# Patient Record
Sex: MALE | Race: WHITE | NOT HISPANIC OR LATINO | URBAN - METROPOLITAN AREA
[De-identification: names, ages, dates, MRNs, and addresses within clinical notes are randomized per-mention and may not be internally consistent; named-entity substitution may affect disease eponyms.]

---

## 2018-10-16 PROBLEM — Z00.00 ENCOUNTER FOR PREVENTIVE HEALTH EXAMINATION: Status: ACTIVE | Noted: 2018-10-16

## 2018-10-22 ENCOUNTER — OUTPATIENT (OUTPATIENT)
Dept: OUTPATIENT SERVICES | Facility: HOSPITAL | Age: 78
LOS: 1 days | End: 2018-10-22
Payer: MEDICARE

## 2018-10-22 ENCOUNTER — APPOINTMENT (OUTPATIENT)
Dept: CT IMAGING | Facility: HOSPITAL | Age: 78
End: 2018-10-22
Payer: MEDICARE

## 2018-10-22 PROCEDURE — 74176 CT ABD & PELVIS W/O CONTRAST: CPT

## 2018-10-22 PROCEDURE — 74176 CT ABD & PELVIS W/O CONTRAST: CPT | Mod: 26

## 2018-10-24 VITALS
HEIGHT: 68 IN | WEIGHT: 139.11 LBS | HEART RATE: 87 BPM | DIASTOLIC BLOOD PRESSURE: 70 MMHG | OXYGEN SATURATION: 98 % | TEMPERATURE: 99 F | RESPIRATION RATE: 16 BRPM | SYSTOLIC BLOOD PRESSURE: 181 MMHG

## 2018-10-25 ENCOUNTER — OUTPATIENT (OUTPATIENT)
Dept: OUTPATIENT SERVICES | Facility: HOSPITAL | Age: 78
LOS: 1 days | Discharge: HOME | End: 2018-10-25
Payer: MEDICARE

## 2018-10-25 ENCOUNTER — RESULT REVIEW (OUTPATIENT)
Age: 78
End: 2018-10-25

## 2018-10-25 VITALS — HEART RATE: 60 BPM | TEMPERATURE: 97 F | RESPIRATION RATE: 18 BRPM | OXYGEN SATURATION: 99 %

## 2018-10-25 PROCEDURE — C1781: CPT

## 2018-10-25 PROCEDURE — 88302 TISSUE EXAM BY PATHOLOGIST: CPT

## 2018-10-25 PROCEDURE — 49505 PRP I/HERN INIT REDUC >5 YR: CPT | Mod: LT

## 2018-10-25 RX ORDER — ACETAMINOPHEN 500 MG
650 TABLET ORAL EVERY 6 HOURS
Qty: 0 | Refills: 0 | Status: DISCONTINUED | OUTPATIENT
Start: 2018-10-25 | End: 2018-10-25

## 2018-10-25 RX ORDER — DOCUSATE SODIUM 100 MG
1 CAPSULE ORAL
Qty: 10 | Refills: 0
Start: 2018-10-25 | End: 2018-10-29

## 2018-10-25 RX ORDER — BUPIVACAINE 13.3 MG/ML
20 INJECTION, SUSPENSION, LIPOSOMAL INFILTRATION ONCE
Qty: 0 | Refills: 0 | Status: DISCONTINUED | OUTPATIENT
Start: 2018-10-25 | End: 2018-10-25

## 2018-10-25 NOTE — BRIEF OPERATIVE NOTE - PROCEDURE
<<-----Click on this checkbox to enter Procedure Left inguinal hernia repair  10/25/2018    Active  DOTTIE

## 2018-10-25 NOTE — PACU DISCHARGE NOTE - COMMENTS
PT AWAKE ALERT AND ORIENTED X3 STATES PAIN IS 2/10 STATES NO DESIRE FOR PAIN MEDICATION AT THIS TIME DRSG TO LT GROIN REMAINS DRY AND INTACT RESP REMAIN REGULAR WITHOUT ANY NOTED DISTRESS PT UP TO BR 1ST TIME VOID 250 CLEAR YELLOW URINE PT VOID 2 MORE TIMES BLADDER SCAN DONE AS ORDERED WHICH NOTED LESS THAN 300ML. DISCHARGE INSTRUCTIONS GIVEN IN DETAIL ALL QUESTIONS ANSWERED DAUGHTER AT BEDSIDE FOR ALL INSTRUCTIONS AND ALL QUESTIONS ANSWERED. PT CHRIS WATER JUICE AND CRACKERS WELL WITHOUT C/O NAUSEA IV D'CD TIP INTACT PT CHRIS WELL PT DRESSED WITH MINIMAL ASST AND DISCHARGED HOME IN CARE OF FAMILY

## 2018-10-29 LAB — SURGICAL PATHOLOGY STUDY: SIGNIFICANT CHANGE UP

## 2022-06-29 ENCOUNTER — APPOINTMENT (OUTPATIENT)
Dept: CT IMAGING | Facility: HOSPITAL | Age: 82
End: 2022-06-29

## 2022-06-29 PROBLEM — I10 ESSENTIAL (PRIMARY) HYPERTENSION: Chronic | Status: ACTIVE | Noted: 2018-10-24

## 2022-06-29 PROBLEM — K21.9 GASTRO-ESOPHAGEAL REFLUX DISEASE WITHOUT ESOPHAGITIS: Chronic | Status: ACTIVE | Noted: 2018-10-24

## 2022-07-05 ENCOUNTER — APPOINTMENT (OUTPATIENT)
Dept: CT IMAGING | Facility: CLINIC | Age: 82
End: 2022-07-05

## 2022-07-05 PROCEDURE — 74176 CT ABD & PELVIS W/O CONTRAST: CPT | Mod: 26,MH

## 2022-07-12 ENCOUNTER — APPOINTMENT (OUTPATIENT)
Dept: HEART AND VASCULAR | Facility: CLINIC | Age: 82
End: 2022-07-12

## 2022-07-12 ENCOUNTER — OUTPATIENT (OUTPATIENT)
Dept: OUTPATIENT SERVICES | Facility: HOSPITAL | Age: 82
LOS: 1 days | End: 2022-07-12
Payer: MEDICARE

## 2022-07-12 DIAGNOSIS — Z01.818 ENCOUNTER FOR OTHER PREPROCEDURAL EXAMINATION: ICD-10-CM

## 2022-07-12 PROCEDURE — 71046 X-RAY EXAM CHEST 2 VIEWS: CPT | Mod: 26

## 2022-07-12 PROCEDURE — 71046 X-RAY EXAM CHEST 2 VIEWS: CPT

## 2022-07-12 PROCEDURE — 93005 ELECTROCARDIOGRAM TRACING: CPT

## 2022-07-12 PROCEDURE — 93010 ELECTROCARDIOGRAM REPORT: CPT | Mod: NC

## 2022-07-12 NOTE — HISTORY OF PRESENT ILLNESS
[FreeTextEntry1] : 81 year old man with history of \par \par is here to establish care and for pre-op optimization prior to

## 2022-07-13 ENCOUNTER — TRANSCRIPTION ENCOUNTER (OUTPATIENT)
Age: 82
End: 2022-07-13

## 2022-07-13 DIAGNOSIS — M41.84 OTHER FORMS OF SCOLIOSIS, THORACIC REGION: ICD-10-CM

## 2022-07-13 NOTE — ASU PATIENT PROFILE, ADULT - ABLE TO REACH PT
Voicemail instruction. Patient instructed to arrive at 9:30AM. No solid food, dairy, candy or gum after midnight, water is allowed before 8:30am Thursday, no jewelries; dress comfortable, no smoking, alcohol drinking or illicit drug use tonight; escort must show proof of vaccination. Address, parking information and call back number provided./no Voicemail instruction. Patient instructed to arrive at 9:30AM. No solid food, dairy, candy or gum after midnight, water is allowed before 8:30am Thursday, come with photo ID, vaccination card, no jewelries; dress comfortable, no smoking, alcohol drinking or illicit drug use tonight; escort must show proof of vaccination. Address, parking information and call back number provided./no

## 2022-07-14 ENCOUNTER — TRANSCRIPTION ENCOUNTER (OUTPATIENT)
Age: 82
End: 2022-07-14

## 2022-07-14 ENCOUNTER — OUTPATIENT (OUTPATIENT)
Dept: OUTPATIENT SERVICES | Facility: HOSPITAL | Age: 82
LOS: 1 days | Discharge: HOME | End: 2022-07-14

## 2022-07-14 ENCOUNTER — RESULT REVIEW (OUTPATIENT)
Age: 82
End: 2022-07-14

## 2022-07-14 VITALS
OXYGEN SATURATION: 98 % | DIASTOLIC BLOOD PRESSURE: 79 MMHG | HEIGHT: 68 IN | SYSTOLIC BLOOD PRESSURE: 157 MMHG | RESPIRATION RATE: 16 BRPM | TEMPERATURE: 98 F | WEIGHT: 145.95 LBS | HEART RATE: 73 BPM

## 2022-07-14 VITALS
TEMPERATURE: 97 F | DIASTOLIC BLOOD PRESSURE: 71 MMHG | RESPIRATION RATE: 16 BRPM | SYSTOLIC BLOOD PRESSURE: 127 MMHG | OXYGEN SATURATION: 99 % | HEART RATE: 69 BPM

## 2022-07-14 LAB — SARS-COV-2 RNA SPEC QL NAA+PROBE: NEGATIVE — SIGNIFICANT CHANGE UP

## 2022-07-14 PROCEDURE — 88302 TISSUE EXAM BY PATHOLOGIST: CPT | Mod: 26

## 2022-07-14 DEVICE — ETHICON HERNIA SECURESTRAP 5MM SIZE 25: Type: IMPLANTABLE DEVICE | Site: BILATERAL | Status: FUNCTIONAL

## 2022-07-14 DEVICE — TROCAR COVIDIEN SPACEMAKER PRO BLUNT TIP 10MM-12MM WITH DISSECTION BALLOON OVAL: Type: IMPLANTABLE DEVICE | Site: BILATERAL | Status: FUNCTIONAL

## 2022-07-14 DEVICE — MESH HERNIA INGUINAL 3DMAX MEDIUM 3 X 5" LEFT: Type: IMPLANTABLE DEVICE | Site: BILATERAL | Status: FUNCTIONAL

## 2022-07-14 DEVICE — MESH HERNIA INGUINAL 3DMAX MEDIUM 3 X 5" RIGHT: Type: IMPLANTABLE DEVICE | Site: BILATERAL | Status: FUNCTIONAL

## 2022-07-14 RX ORDER — TAMSULOSIN HYDROCHLORIDE 0.4 MG/1
0.4 CAPSULE ORAL ONCE
Refills: 0 | Status: COMPLETED | OUTPATIENT
Start: 2022-07-14 | End: 2022-07-14

## 2022-07-14 RX ORDER — SODIUM CHLORIDE 9 MG/ML
1000 INJECTION, SOLUTION INTRAVENOUS
Refills: 0 | Status: DISCONTINUED | OUTPATIENT
Start: 2022-07-14 | End: 2022-07-14

## 2022-07-14 RX ORDER — DOCUSATE SODIUM 100 MG
1 CAPSULE ORAL
Qty: 10 | Refills: 0
Start: 2022-07-14 | End: 2022-07-18

## 2022-07-14 RX ORDER — ONDANSETRON 8 MG/1
4 TABLET, FILM COATED ORAL ONCE
Refills: 0 | Status: DISCONTINUED | OUTPATIENT
Start: 2022-07-14 | End: 2022-07-14

## 2022-07-14 RX ORDER — OXYCODONE HYDROCHLORIDE 5 MG/1
5 TABLET ORAL ONCE
Refills: 0 | Status: DISCONTINUED | OUTPATIENT
Start: 2022-07-14 | End: 2022-07-14

## 2022-07-14 RX ORDER — ACETAMINOPHEN 500 MG
650 TABLET ORAL EVERY 6 HOURS
Refills: 0 | Status: DISCONTINUED | OUTPATIENT
Start: 2022-07-14 | End: 2022-07-14

## 2022-07-14 RX ORDER — ACETAMINOPHEN WITH CODEINE 300MG-30MG
1 TABLET ORAL
Qty: 8 | Refills: 0
Start: 2022-07-14 | End: 2022-07-15

## 2022-07-14 RX ORDER — AMLODIPINE BESYLATE 2.5 MG/1
1 TABLET ORAL
Qty: 0 | Refills: 0 | DISCHARGE

## 2022-07-14 RX ADMIN — TAMSULOSIN HYDROCHLORIDE 0.4 MILLIGRAM(S): 0.4 CAPSULE ORAL at 12:18

## 2022-07-14 RX ADMIN — SODIUM CHLORIDE 75 MILLILITER(S): 9 INJECTION, SOLUTION INTRAVENOUS at 17:08

## 2022-07-14 NOTE — ASU DISCHARGE PLAN (ADULT/PEDIATRIC) - ASU DC SPECIAL INSTRUCTIONSFT
-Bed rest for 4 days  -Ice packs ALL the time  -At least 2L of water in 24 hrs  -2 extra strength tylenol + 1 advil + 3 tablets at the same time EVERY 6 hours regardless if you have pain or not for 4 days, after as needed  -Liquid diet until bowel movement  -Tylenol #3 at bed time if needed

## 2022-07-14 NOTE — BRIEF OPERATIVE NOTE - OPERATION/FINDINGS
laparoscopic bilateral inguinal hernia repair; preperitoneal space bilaterally dissected with monopolar scissor, round ligament identified, hernias reduced, hernias repaired bilaterally with progrip mesh x 2; preperitoneal space closed bilaterally with quill suture. hemostasis achieved. skin and fascia closed in the usual fashion

## 2022-07-14 NOTE — ASU DISCHARGE PLAN (ADULT/PEDIATRIC) - CARE PROVIDER_API CALL
John Weeks  SURGERY  [address], [address]  [place] [address]  Phone: [phone]  Fax: [phone]  Follow Up Time:

## 2022-07-14 NOTE — ASU DISCHARGE PLAN (ADULT/PEDIATRIC) - NS MD DC FALL RISK RISK
For information on Fall & Injury Prevention, visit: [email or web address] OR  [email or web address] OR  https://www.cdc.gov/steadi/patient.html

## 2022-07-14 NOTE — BRIEF OPERATIVE NOTE - NSICDXBRIEFPROCEDURE_GEN_ALL_CORE_FT
PROCEDURES:  Laparoscopic repair of bilateral inguinal hernias with mesh 14-Jul-2022 16:24:12  Franco Pan

## 2022-07-19 ENCOUNTER — APPOINTMENT (OUTPATIENT)
Dept: HEART AND VASCULAR | Facility: CLINIC | Age: 82
End: 2022-07-19

## 2022-07-20 LAB — SURGICAL PATHOLOGY STUDY: SIGNIFICANT CHANGE UP

## 2023-02-21 NOTE — ASU PATIENT PROFILE, ADULT - TOBACCO USE
No active chest pain as per son   -EKG showed bradycardia with T wave inversion in the lateral leads, could be due to hypoMg  -Replete Mg and keep K>4 and Mg >2   -C/w telemonitoring   -Obtain records from son in the AM Never smoker

## (undated) DEVICE — ENDOCATCH 10MM SPECIMEN POUCH

## (undated) DEVICE — SUT PROLENE 0 30" CT-2

## (undated) DEVICE — VENODYNE/SCD SLEEVE CALF MEDIUM

## (undated) DEVICE — PACK GENERAL MINOR

## (undated) DEVICE — SUT VICRYL 2-0 27" SH

## (undated) DEVICE — SYR LUER LOK 10CC

## (undated) DEVICE — SUT VICRYL 2-0 18" TIES

## (undated) DEVICE — PREP CHLORAPREP HI-LITE ORANGE 26ML

## (undated) DEVICE — SUT MONOCRYL 4-0 18" PS-2

## (undated) DEVICE — GLV 8 PROTEXIS (WHITE)

## (undated) DEVICE — POSITIONER FOAM EGG CRATE ULNAR 2PCS (PINK)

## (undated) DEVICE — SUT PROLENE 2-0 30" CT-2